# Patient Record
(demographics unavailable — no encounter records)

---

## 2024-12-06 NOTE — DISCUSSION/SUMMARY
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   RELEVANT MEDICAL HISTORY Ms. Shen is a healthy individual who has never had cancer. She has a family history of cancer, see below.  Ms. Shen's mother, Irasema, underwent [employer] [employer] panel (20 genes), and was found to have a PMS2 exon 8 deletion. This test was ordered by Dr. Wiliam Berg and reported on 2024.   OTHER MEDICAL AND SURGICAL HISTORY: -	Medical History: None.  -	Surgical History: C-sections.   PAST OB/GYN HISTORY: Height: 5'8" Weight: 120 lbs Obstetrical History:  Age at Menarche: 14 Premenopausal  Age at First Live Birth: 28 Oral Contraceptive Use: Yes, former use, 8-10 years, Hormonal IUD: 5 years, former use.  Hormone Replacement Therapy: No.   CANCER SCREENING HISTORY:   Breast:  -	Mammography: None.  -	Sonography: None.  -	MRI: None.  -	Biopsies: None.  GYN: -	Pelvic Examination & Pap Smear: most recent in : Negative; Frequency: Annual Colon: -	Colonoscopy: None.  -	Upper Endoscopy: None.  Skin:   -	FBSE: None.  -	Lesions biopsied/removed: None.   SOCIAL HISTORY: -	Occupation: Special .  -	Tobacco-product use: No.  -	Environmental exposures (like asbestos/toxic chemicals/radiation): No.   FAMILY HISTORY: Maternal and paternal ancestry was reported as . Ashkenazi Lutheran ancestry was denied. A detailed family history of cancer was ascertained. Relevant diagnoses are detailed below and in the scanned pedigree.  	 RISK ASSESSMENT: 	Ms. Shen has a 50% chance of having inherited the PMS2 mutation from her mother. Given Ms. Shen's unknown paternal family history, she requested expansion of her genetic testing to include all genes associated with breast and gynecological cancers.   We recommended genetic testing for genes associated with breast and gynecological cancer. This test analyzes 19 genes: ODIN, BARD1, BRCA1, BRCA2, BRIP1, CDH1, CHEK2, EPCAM, MLH1, MSH2, MSH6, NF1, PALB2, PMS2, PTEN, RAD51C, RAD51D, STK11, and TP53.  We discussed the risks, benefits and limitations, and implications of genetic testing. We also discussed the psychosocial implications of genetic testing. Possible test results were reviewed with Ms. Shen, along with associated medical management options. The Genetic Information Non-discrimination Act (NIKHIL) was also reviewed.   Ms. Shen consented to the above-mentioned genetic testing panel. Blood was drawn in our laboratory and sent to [facility] today.  PLAN: 1.	Blood drawn today will be sent to [facility] for analysis.  2.	We will contact Ms. Shen once the results are available and will schedule a follow-up appointment, as needed. Results generally return in 2-3 weeks from the day the sample is received in the lab.  For any additional questions please call Cancer Genetics at [phone].   Elina Cho MS, [facility] Counselor, Cancer Genetics